# Patient Record
(demographics unavailable — no encounter records)

---

## 2024-10-21 NOTE — HISTORY OF PRESENT ILLNESS
[de-identified] : 3 week follow up visit for this 40 y/o M. He was dx'd left vallecular redness/fullness and tx'd with augmentin during last visit with partial improvement of symptoms. He reports that he is still feeling globus sensation, odynophagia, but not as prominent. He also reports that wife noticed that he would occasional cough as if he is choking while he is sleeping. He reports snoring. He was scheduled for sleep study, inpatient x4 years ago, unable to be completed due to pandemic. he had home version and was recommended to have supervised version. He reports dry mouth in the AM. He reports occasional daytime somnolence. He denies dysphagia, sore throat, fever.

## 2024-10-21 NOTE — PROCEDURE
[Normal] : posterior cricoid area had healthy pink mucosa in the interarytenoid area and the esophageal inlet [Dysphagia] : dysphagia not clearly evaluated by indirect laryngoscopy [Globus] : globus [Topical Lidocaine] : topical lidocaine [Oxymetazoline HCl] : oxymetazoline HCl [Flexible Endoscope] : examined with the flexible endoscope [Serial Number: ___] : Serial Number: [unfilled] [de-identified] : done to eval for l vallecular redness and persistence of odynophagia findings: increased vasculature/redness to l vallecula, r DNS  [de-identified] : left red

## 2024-10-21 NOTE — PROCEDURE
[Normal] : posterior cricoid area had healthy pink mucosa in the interarytenoid area and the esophageal inlet [Dysphagia] : dysphagia not clearly evaluated by indirect laryngoscopy [Globus] : globus [Topical Lidocaine] : topical lidocaine [Oxymetazoline HCl] : oxymetazoline HCl [Flexible Endoscope] : examined with the flexible endoscope [Serial Number: ___] : Serial Number: [unfilled] [de-identified] : done to eval for l vallecular redness and persistence of odynophagia findings: increased vasculature/redness to l vallecula, r DNS  [de-identified] : left red

## 2024-10-21 NOTE — PHYSICAL EXAM
[Midline] : trachea located in midline position [Laryngoscopy Performed] : laryngoscopy was performed, see procedure section for findings [Normal] : no rashes [de-identified] : gait steady

## 2024-10-21 NOTE — ASSESSMENT
[FreeTextEntry1] : 1. globus sensation, red area left vallecula which did not improve after abx -MRI neck soft tissue w/ w/o -RTC to discuss results  2. ro ting sleep study, inpatient -sleep study ordered

## 2024-10-21 NOTE — HISTORY OF PRESENT ILLNESS
[de-identified] : 3 week follow up visit for this 42 y/o M. He was dx'd left vallecular redness/fullness and tx'd with augmentin during last visit with partial improvement of symptoms. He reports that he is still feeling globus sensation, odynophagia, but not as prominent. He also reports that wife noticed that he would occasional cough as if he is choking while he is sleeping. He reports snoring. He was scheduled for sleep study, inpatient x4 years ago, unable to be completed due to pandemic. he had home version and was recommended to have supervised version. He reports dry mouth in the AM. He reports occasional daytime somnolence. He denies dysphagia, sore throat, fever.

## 2024-10-21 NOTE — REASON FOR VISIT
[Subsequent Evaluation] : a subsequent evaluation for [FreeTextEntry2] : globus sensation,odynophagia

## 2024-10-21 NOTE — PHYSICAL EXAM
[Midline] : trachea located in midline position [Laryngoscopy Performed] : laryngoscopy was performed, see procedure section for findings [Normal] : no rashes [de-identified] : gait steady

## 2024-10-30 NOTE — ASSESSMENT
[FreeTextEntry1] : resolved inflammation of vallecula he feels much better await sleep study rtc if recurs

## 2024-10-30 NOTE — DATA REVIEWED
[de-identified] : Mri reviewed images and results with patient; apears over-read sinuses appear nl to me 1. Mild hypertrophy of the bilateral palatine tonsils. No masslike abnormal enhancement.  2. No suspicious lymphadenopathy.  3. Mild to moderate mucosal thickening in the left maxillary sinus and mild mucosal thickening in the ethmoidal air cells.

## 2024-10-30 NOTE — HISTORY OF PRESENT ILLNESS
[de-identified] : 6 d follow up appt for this 42 yo m with dysphagia and vallecular lesion (thickness and redness) - he took abx and sx did not improve -had mri but notes that sx got much better over the past week. -f.s.c and they are minimal at present.

## 2024-10-30 NOTE — PROCEDURE
[Dysphagia] : dysphagia not clearly evaluated by indirect laryngoscopy [Rigid Endoscope] : examined with a rigid endoscope [Serial Number: ___] : Serial Number: [unfilled] [Normal] : posterior cricoid area had healthy pink mucosa in the interarytenoid area and the esophageal inlet [de-identified] : done to recheck vallecula today appears clear and blood vessels are symmetric

## 2024-10-30 NOTE — REASON FOR VISIT
[Subsequent Evaluation] : a subsequent evaluation for [FreeTextEntry2] : odynophagia/red lesion in vallecula